# Patient Record
Sex: MALE | URBAN - METROPOLITAN AREA
[De-identification: names, ages, dates, MRNs, and addresses within clinical notes are randomized per-mention and may not be internally consistent; named-entity substitution may affect disease eponyms.]

---

## 2021-07-22 ENCOUNTER — OFFICE VISIT (OUTPATIENT)
Dept: URBAN - METROPOLITAN AREA CLINIC 70 | Facility: CLINIC | Age: 46
End: 2021-07-22

## 2021-09-14 ENCOUNTER — OFFICE VISIT (OUTPATIENT)
Dept: URBAN - METROPOLITAN AREA CLINIC 70 | Facility: CLINIC | Age: 46
End: 2021-09-14

## 2021-09-14 NOTE — HPI-TODAY'S VISIT:
Patient referred by MAURICE Delacruz for evaluation of diarrhea.  A copy of this note will be sent to the referring provider.  Patient reports onset of watery stools for 3 weeks with fever 2 months ago.  He was treated with 5 days of Levaquin by his PCP.  Currently voices  Labs 7/5/2021 :  WBC 10.3, Hgb 14.2, MCV 89.7, , Sodium 133, Potassium 33 and AST 34.